# Patient Record
Sex: FEMALE | Race: WHITE | NOT HISPANIC OR LATINO | Employment: UNEMPLOYED | URBAN - METROPOLITAN AREA
[De-identification: names, ages, dates, MRNs, and addresses within clinical notes are randomized per-mention and may not be internally consistent; named-entity substitution may affect disease eponyms.]

---
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
## 2025-04-18 ENCOUNTER — HOSPITAL ENCOUNTER (EMERGENCY)
Facility: CLINIC | Age: 20
Discharge: HOME | End: 2025-04-18
Attending: STUDENT IN AN ORGANIZED HEALTH CARE EDUCATION/TRAINING PROGRAM | Admitting: STUDENT IN AN ORGANIZED HEALTH CARE EDUCATION/TRAINING PROGRAM
Payer: MEDICAID

## 2025-04-18 VITALS
HEART RATE: 102 BPM | RESPIRATION RATE: 22 BRPM | OXYGEN SATURATION: 98 % | TEMPERATURE: 97.5 F | WEIGHT: 168.5 LBS | BODY MASS INDEX: 28.59 KG/M2

## 2025-04-18 DIAGNOSIS — H66.92 ACUTE LEFT OTITIS MEDIA: ICD-10-CM

## 2025-04-18 PROCEDURE — 250N000013 HC RX MED GY IP 250 OP 250 PS 637: Performed by: STUDENT IN AN ORGANIZED HEALTH CARE EDUCATION/TRAINING PROGRAM

## 2025-04-18 PROCEDURE — 99283 EMERGENCY DEPT VISIT LOW MDM: CPT

## 2025-04-18 RX ORDER — IBUPROFEN 100 MG/5ML
400 SUSPENSION ORAL ONCE
Status: COMPLETED | OUTPATIENT
Start: 2025-04-18 | End: 2025-04-18

## 2025-04-18 RX ORDER — AMOXICILLIN AND CLAVULANATE POTASSIUM 400; 57 MG/5ML; MG/5ML
875 POWDER, FOR SUSPENSION ORAL ONCE
Status: COMPLETED | OUTPATIENT
Start: 2025-04-18 | End: 2025-04-18

## 2025-04-18 RX ORDER — AMOXICILLIN AND CLAVULANATE POTASSIUM 400; 57 MG/5ML; MG/5ML
875 POWDER, FOR SUSPENSION ORAL 2 TIMES DAILY
Qty: 153.16 ML | Refills: 0 | Status: SHIPPED | OUTPATIENT
Start: 2025-04-18 | End: 2025-04-25

## 2025-04-18 RX ORDER — ACETAMINOPHEN 325 MG/10.15ML
650 LIQUID ORAL ONCE
Status: COMPLETED | OUTPATIENT
Start: 2025-04-18 | End: 2025-04-18

## 2025-04-18 RX ADMIN — IBUPROFEN 400 MG: 100 SUSPENSION ORAL at 01:13

## 2025-04-18 RX ADMIN — ACETAMINOPHEN 650 MG: 325 SOLUTION ORAL at 01:12

## 2025-04-18 RX ADMIN — AMOXICILLIN AND CLAVULANATE POTASSIUM 875 MG: 400; 57 POWDER, FOR SUSPENSION ORAL at 01:11

## 2025-04-18 ASSESSMENT — ACTIVITIES OF DAILY LIVING (ADL): ADLS_ACUITY_SCORE: 41

## 2025-04-18 ASSESSMENT — COLUMBIA-SUICIDE SEVERITY RATING SCALE - C-SSRS: IS THE PATIENT NOT ABLE TO COMPLETE C-SSRS: UNABLE TO VERBALIZE

## 2025-04-18 NOTE — DISCHARGE INSTRUCTIONS
The antibiotics that we are prescribing you will help with the infection, however using Tylenol and Motrin to help with discomfort will also help her with her healing.    Please return to the emergency department if symptoms worsen.

## 2025-04-18 NOTE — ED TRIAGE NOTES
Patient presents to the ED, brought in by mother, mother states patient came home from school today with drainage from her eyes and screaming pulling at her left ear.  Father gave Robitussin last night.  Patient is nonverbal at baseline, she is screaming out and grabbed the otoscope multiple times in triage placing it in her left ear.       Triage Assessment (Adult)       Row Name 04/18/25 0036          Triage Assessment    Airway WDL WDL        Respiratory WDL    Respiratory WDL WDL        Skin Circulation/Temperature WDL    Skin Circulation/Temperature WDL WDL        Cardiac WDL    Cardiac WDL WDL        Peripheral/Neurovascular WDL    Peripheral Neurovascular WDL WDL        Cognitive/Neuro/Behavioral WDL    Cognitive/Neuro/Behavioral WDL X;speech     Speech other (see comments)  nonverbal per mother        Vance Coma Scale    Assessment Qualifiers patient not sedated/intubated;no eye obstruction present                      Pt to ED via EMS from home with daughter c/o generalized weakness. Pt reports feeling like her legs want to give out. Pt and pt family reports no falls. Pt sts she is supposed to be walking with a walker but does not. Pt is AxO x3 but hx of dementia.

## 2025-04-18 NOTE — ED PROVIDER NOTES
EMERGENCY DEPARTMENT ENCOUNTER      NAME: Vanessa Wharton  AGE: 19 year old female  YOB: 2005  MRN: [id number]  EVALUATION DATE & TIME: 4/18/2025 12:34 AM    PCP: Ami Arce    ED PROVIDER: Ryan Silver MD      Chief Complaint   Patient presents with    Fever         FINAL IMPRESSION:  1. Acute left otitis media          ED COURSE & MEDICAL DECISION MAKING:    Pertinent Labs & Imaging studies reviewed. (See chart for details)  19 year old female presents to the Emergency Department for evaluation of fever, ear pain    ED Course as of 04/18/25 0059   Fri Apr 18, 2025   0056 Patient is a 19-year-old female with a past medical history significant for moderate electrical disability, nonverbal, and presents to the emergency department with ear pain.  She has been pulling at her left ear since this evening as well as having bilateral conjunctival irritation and drainage.  She continues to gesture with the otoscope at her left ear.  On exam there is no evidence of otitis externa, but does have evidence of otitis media with a bulging and erythematous left TM with purulent fluid behind the TM.  No perforation.  No evidence of mastoiditis.  She is afebrile.  Plan to treat with Augmentin.  I discussed with mom regarding whether pills or liquids are best, and she preferred the latter.  Will also try to treat with Tylenol and ibuprofen if she will take the medications.  Prescribed Augmentin for outpatient use, and discharged with return precautions       Medical Decision Making  I obtained history from Family Member/Significant Other  Care impacted by intellectual disability, non-verbal  Discharge. I prescribed additional prescription strength medication(s) as charted. See documentation for any additional details.    MIPS (CTPE, Dental pain, [place], Sinusitis, Asthma/COPD, Head Trauma): Not Applicable    SEPSIS: None            At the conclusion of the encounter I discussed the results of all of  the tests and the disposition. The questions were answered. The patient or family acknowledged understanding and was agreeable with the care plan.     0 minutes of critical care time     MEDICATIONS GIVEN IN THE EMERGENCY:  Medications   amoxicillin-clavulanate (AUGMENTIN) 400-57 MG/5ML suspension 875 mg (has no administration in time range)   acetaminophen (TYLENOL) oral liquid 650 mg (has no administration in time range)   ibuprofen (ADVIL/MOTRIN) suspension 400 mg (has no administration in time range)       NEW PRESCRIPTIONS STARTED AT TODAY'S ER VISIT  New Prescriptions    AMOXICILLIN-CLAVULANATE (AUGMENTIN) 400-57 MG/5ML SUSPENSION    Take 10.94 mLs (875 mg) by mouth 2 times daily for 7 days.          =================================================================    HPI    Patient information was obtained from: Patient's Mother    Use of : N/A         Vanessa Wharton is a 19 year old female with a pertinent history of autistic disorder who presents to this ED via walk-in for evaluation of otalgia.    HPI Limited. Patient is nonverbal    Mom reports that around 1900 last night after coming home from school, patient started having some drainage from her eyes as well as some congestion, and was pulling at her left ear. Mom also notes she has had a productive cough as well as some swollen eyes. Father gave Robitussin last night. No other complaints at this time.    PAST MEDICAL HISTORY:  History reviewed. No pertinent past medical history.    PAST SURGICAL HISTORY:  History reviewed. No pertinent surgical history.        CURRENT MEDICATIONS:    amoxicillin-clavulanate (AUGMENTIN) 400-57 MG/5ML suspension  medroxyPROGESTERone (DEPO-PROVERA) 150 mg/mL injection        ALLERGIES:  No Known Allergies    FAMILY HISTORY:  Family History   Problem Relation Age of Onset    Autism Spectrum Disorder Brother     Diabetes Father     Attention Deficit Disorder Brother     Hypertension Father     Hyperlipidemia  Father        SOCIAL HISTORY:   Social History     Socioeconomic History    Marital status: Single   Tobacco Use    Smoking status: Never     Passive exposure: Never    Smokeless tobacco: Never   Vaping Use    Vaping status: Never Used       VITALS:  Pulse (!) 124   Temp 97.5  F (36.4  C) (Temporal)   Resp 22   Wt 76.4 kg (168 lb 8 oz)   SpO2 98%   BMI 28.59 kg/m      PHYSICAL EXAM    Physical Exam  Vitals and nursing note reviewed.   Constitutional:       General: She is not in acute distress.     Appearance: Normal appearance. She is normal weight. She is not ill-appearing.   HENT:      Head: Normocephalic and atraumatic.      Right Ear: Ear canal and external ear normal. There is impacted cerumen.      Left Ear: Ear canal and external ear normal.      Ears:      Comments: Left TM erythematous, bulging, with purulent material behind it.  No perforation.     Nose: Nose normal.      Mouth/Throat:      Mouth: Mucous membranes are moist.      Pharynx: Oropharynx is clear.   Eyes:      General:         Right eye: Discharge present.         Left eye: Discharge present.     Extraocular Movements: Extraocular movements intact.      Pupils: Pupils are equal, round, and reactive to light.   Cardiovascular:      Rate and Rhythm: Regular rhythm. Tachycardia present.      Pulses: Normal pulses.   Pulmonary:      Effort: Pulmonary effort is normal. No respiratory distress.      Breath sounds: Normal breath sounds.   Abdominal:      General: Abdomen is flat.   Musculoskeletal:         General: Normal range of motion.      Cervical back: Normal range of motion and neck supple. No rigidity.   Skin:     General: Skin is warm and dry.   Neurological:      Mental Status: She is alert. Mental status is at baseline.            LAB:  All pertinent labs reviewed and interpreted.       RADIOLOGY:  Reviewed all pertinent imaging. Please see official radiology report.  No orders to display                    Sidra MORALES am serving  as a scribe to document services personally performed by Ryan Silver MD based on my observation and the provider's statements to me. I, Ryan Silver MD, attest that Sidra Perry is acting in a scribe capacity, has observed my performance of the services and has documented them in accordance with my direction.    Ryan Silver MD  [facility] EMERGENCY ROOM  [address] [address]  [phone]       Ryan Silver MD  04/18/25 0059

## 2025-04-18 NOTE — Clinical Note
Dio was seen and treated in our emergency department on 4/18/2025.  She may return to school on 04/19/2025.      If you have any questions or concerns, please don't hesitate to call.      Ryan Silver MD

## 2025-04-21 ENCOUNTER — PATIENT OUTREACH (OUTPATIENT)
Dept: NURSING | Facility: CLINIC | Age: 20
End: 2025-04-21
Payer: MEDICAID

## 2025-04-21 NOTE — TELEPHONE ENCOUNTER
Joanna Camargo RN called mother Vanessa on 4/21 and left a message to return the call to the clinic for hospital/emergency room follow up after discharge on 4/18.    If patient calls back, please route to [facility].    TC please route to RN.    JESUS Horner RN  [facility]    Notes for RN in case of return call from patient or second attempt call:  Discharged on 4/18 from [facility].  ER or Hospital? ED  Diagnosis for visit: acute left otitis media  Other information if necessary: Patient has autism, is non verbal.  Was given liquid Augmentin to treat ear infection. No upcoming appointments scheduled.  Last saw Dr. Arce June 2023    Delete this note after TCM call documentation is completed.

## 2025-04-23 NOTE — TELEPHONE ENCOUNTER
Transitions of Care Outreach  Chief Complaint   Patient presents with    Hospital F/U       Most Recent Admission Date: 4/18/2025   Most Recent Admission Diagnosis:      Most Recent Discharge Date: 4/18/2025   Most Recent Discharge Diagnosis: Acute left otitis media - H66.92     Transitions of Care Assessment    Discharge Assessment  How are you doing now that you are home?: Doing fine according to father  How are your symptoms? (Red Flag symptoms escalate to triage hotline per guidelines): Improved  Do you know how to contact your clinic care team if you have future questions or changes to your health status? : Yes  Does the patient have their discharge instructions? : Yes  Does the patient have questions regarding their discharge instructions? : No  Were you started on any new medications or were there changes to any of your previous medications? : Yes  Does the patient have all of their medications?: Yes  Do you have questions regarding any of your medications? : No  Do you have all of your needed medical supplies or equipment (DME)?  (i.e. oxygen tank, CPAP, cane, etc.): Yes    Follow up Plan     Discharge Follow-Up  Discharge follow up appointment scheduled in alignment with recommended follow up timeframe or Transitions of Risk Category? (Low = within 30 days; Moderate= within 14 days; High= within 7 days): Yes  Discharge Follow Up Appointment Date: 04/24/25  Discharge Follow Up Appointment Scheduled with?: Primary Care Provider    Future Appointments   Date Time Provider Department Center   4/24/2025  5:30 PM Ami Arce MD [id number]       Outpatient Plan as outlined on AVS reviewed with patient.    For any urgent concerns, please contact our 24 hour nurse triage line: [phone] ([phone])       Mariam Serna RN

## 2025-04-24 ENCOUNTER — OFFICE VISIT (OUTPATIENT)
Dept: FAMILY MEDICINE | Facility: CLINIC | Age: 20
End: 2025-04-24
Payer: MEDICAID

## 2025-04-24 VITALS
SYSTOLIC BLOOD PRESSURE: 122 MMHG | HEIGHT: 64 IN | HEART RATE: 128 BPM | DIASTOLIC BLOOD PRESSURE: 70 MMHG | WEIGHT: 167 LBS | OXYGEN SATURATION: 97 % | RESPIRATION RATE: 18 BRPM | BODY MASS INDEX: 28.51 KG/M2

## 2025-04-24 DIAGNOSIS — H65.92 OME (OTITIS MEDIA WITH EFFUSION), LEFT: ICD-10-CM

## 2025-04-24 DIAGNOSIS — Z76.89 SEEN IN EMERGENCY ROOM: Primary | ICD-10-CM

## 2025-04-24 DIAGNOSIS — R62.0 DELAYED MILESTONES: ICD-10-CM

## 2025-04-24 DIAGNOSIS — F84.0 ACTIVE AUTISTIC DISORDER: ICD-10-CM

## 2025-04-24 PROCEDURE — 99213 OFFICE O/P EST LOW 20 MIN: CPT | Performed by: FAMILY MEDICINE

## 2025-04-24 PROCEDURE — 1126F AMNT PAIN NOTED NONE PRSNT: CPT | Performed by: FAMILY MEDICINE

## 2025-04-24 PROCEDURE — 3074F SYST BP LT 130 MM HG: CPT | Performed by: FAMILY MEDICINE

## 2025-04-24 PROCEDURE — 3078F DIAST BP <80 MM HG: CPT | Performed by: FAMILY MEDICINE

## 2025-04-24 PROCEDURE — G2211 COMPLEX E/M VISIT ADD ON: HCPCS | Performed by: FAMILY MEDICINE

## 2025-04-24 ASSESSMENT — PAIN SCALES - GENERAL: PAINLEVEL_OUTOF10: NO PAIN (0)

## 2025-06-23 ENCOUNTER — TRANSFERRED RECORDS (OUTPATIENT)
Dept: HEALTH INFORMATION MANAGEMENT | Facility: CLINIC | Age: 20
End: 2025-06-23
Payer: MEDICAID

## 2025-06-23 LAB — EJECTION FRACTION: 61 %
